# Patient Record
(demographics unavailable — no encounter records)

---

## 2017-07-20 NOTE — MAMMOGRAPHY REPORT
BILATERAL DIGITAL SCREENING MAMMOGRAM TOMOSYNTHESIS WITH CAD: 7/19/2017

CLINICAL HISTORY: Routine screening examination.  





TECHNIQUE:  Breast tomosynthesis in addition to standard 2D mammography was performed. Current study 
was also evaluated with a Computer Aided Detection (CAD) system.  



COMPARISON: Comparison is made to exams dated:  6/12/2015 mammogram, 6/17/2016 mammogram, 5/3/2013 ma
mmogram, 4/27/2012 mammogram, 4/22/2011 mammogram, and 4/2/2010 mammogram - [facility].   



BREAST COMPOSITION:  There are scattered areas of fibroglandular density in both breasts.  



FINDINGS: There are a few stable benign-appearing microcalcifications in the breasts.  No suspicious 
mass, architectural distortion or cluster of suspicious microcalcifications is seen.  



IMPRESSION:  ACR BI-RADS CATEGORY 1: NEGATIVE

There is no mammographic evidence of malignancy. A 1 year screening mammogram is recommended.  The pa
tient will receive written notification of the results.  





Approximately 10% of breast cancers are not detected with mammography. A negative mammographic report
 should not delay biopsy if a clinically suggestive mass is present.



Toshia Suazo M.D.          

ay/:7/19/2017 16:24:25  



Imaging Technologist: Marija FERGUSON(MARIA DEL CARMEN)(JOY), [facility]

letter sent: Normal 1/2  

BI-RADS Code: ACR BI-RADS Category 1: Negative

## 2017-10-13 NOTE — DIAGNOSTIC IMAGING REPORT
ABDOMEN AND PELVIS CT WITH IV CONTRAST



CT DOSE: 526.72 mGy.cm



HISTORY:      low abd pain, diarrhea



TECHNIQUE: Multiaxial CT images of the abdomen and pelvis were performed

following the use of intravenous contrast.  A dose lowering technique was

utilized adhering to the principles of ALARA.



COMPARISON STUDY: None.



FINDINGS: The lung bases are clear. No pneumoperitoneum. No pneumatosis. No

suspicious lytic or blastic osseous lesions. Tiny hiatus hernia. Tiny

fat-containing umbilical hernia. No retroperitoneal lymphadenopathy. The bladder

is unremarkable. The uterus is surgically absent. The liver, gallbladder,

spleen, adrenal glands, pancreas, and kidneys are unremarkable. Liquid stool

seen within the colon. Colonic diverticulosis. Normal appendix. There are

suggestion of minimal thickening and inflammatory change surrounding a short

segment of bowel within the right lower quadrant on images 72 through 75. This

favors a mild ileitis. Proximal to this decompressed loop of bowel there is a

slightly distended fluid-filled segment of small bowel measure up to 3 cm.



IMPRESSION: 

1. Suggestion of minimal thickening and inflammatory change surrounding a short

segment of bowel within the right lower quadrant. This likely represents a mild

ileitis. This favors infectious/inflammatory change.

2. Liquid stool throughout the colon consistent with the suspected

gastroenteritis/ileitis.

3. Single borderline distended loop of small bowel within the deep pelvis

proximal to the thickened loop of small bowel. This could represent a low-grade

partial small bowel obstruction due to the thickened loop of small bowel.

4. Normal appendix. 







Electronically signed by:  Gerald Tan M.D.

10/13/2017 11:29 AM



Dictated Date/Time:  10/13/2017 11:18 AM

## 2017-10-13 NOTE — EMERGENCY ROOM VISIT NOTE
History


First contact with patient:  09:01


Chief Complaint:  VOMITING


Stated Complaint:  VOMITING,STOMACH CRAMPING,DIARRHEA


Nursing Triage Summary:  


pt reports 2 weeks ago had flu like sx was tx by PCP for this with IV fluid and 


blood work. then started 2 days ago with NVD again took immodium with no help 


unable to keep food or liquid down





History of Present Illness


The patient is a 58 year old female who presents to the Emergency Room with 

complaints of abdominal cramps, vomiting and diarrhea.  The patient reports 

that her symptoms initially began last week.  She states that she developed 

some diarrhea approximately one week and a half ago and was seen by her primary 

care provider at that time and given Imodium and IV fluids.  She reports that 

she improved, however her symptoms returned yesterday.  She reports she has had 

some diarrhea, vomiting and lower abdominal cramping.  She rates her discomfort 

a 6/10.  She states that she has had frequent episodes of both diarrhea and 

vomiting.  She has been able to keep down some fluids, but no solid food.  She 

denies any recent antibiotic use, food exposures, unknown water sources or 

travel.  She does report she has had giardia in the past.  She denies any fevers

/chills or urinary symptoms.





Review of Systems


A complete 10 point review of systems was reviewed with the patient with 

pertinent positives and negatives as per history of present illness. All else 

were negative.





Past Medical/Surgical History


Medical Problems:


(1) Achilles rupture, left


(2) Benign hypertension


(3) Carpal tunnel syndrome


(4) Hyperlipidemia


(5) Hysterectomy


(6) Influenza A








Family History





Diabetes mellitus


FH: hypertension





Social History


Smoking Status:  Never Smoker


Alcohol Use:  occasionally


Marital Status:  


Housing Status:  lives with family


Occupation Status:  employed





Current/Historical Medications


Scheduled


Atenolol (Tenormin), 50 MG PO DAILY


Citalopram Hydrobromide (Celexa), 40 MG PO DAILY


Ondasetron Odt (Zofran Odt), 4 MG SL Q6H


Pravastatin Sod (Pravastatin Sodium), 40 MG PO DAILY





Physical Exam


Vital Signs











  Date Time  Temp Pulse Resp B/P (MAP) Pulse Ox O2 Delivery O2 Flow Rate FiO2


 


10/13/17 11:27  64 18 110/64 94 Room Air  


 


10/13/17 08:56 36.6 51 18 102/68 98 Room Air  











Physical Exam


VITALS: Vitals are noted on the nurse's note and reviewed by myself.  Vital 

signs stable.


GENERAL: This is a 58-year-old female, in no acute distress, nondiaphoretic, 

well-developed well-nourished.


SKIN: Capillary reflex less than 2 seconds.


HEENT: Normocephalic.  PERRLA. Mucous membranes moist.  Neck is supple without 

nuchal rigidity.


HEART: Regular rate and rhythm without murmurs gallops or rubs.


LUNGS: Clear to auscultation bilaterally without wheezes, rales or rhonchi.  


ABDOMEN: Positive bowel sounds x 4.  Soft, mild tenderness across the lower 

abdomen.  No guarding or rebound tenderness.


NEURO: Patient was alert and oriented to person place and time.





Medical Decision & Procedures


ER Provider


Diagnostic Interpretation:


ABDOMEN AND PELVIS CT WITH IV CONTRAST





CT DOSE: 526.72 mGy.cm





HISTORY:      low abd pain, diarrhea





TECHNIQUE: Multiaxial CT images of the abdomen and pelvis were performed


following the use of intravenous contrast.  A dose lowering technique was


utilized adhering to the principles of ALARA.





COMPARISON STUDY: None.





FINDINGS: The lung bases are clear. No pneumoperitoneum. No pneumatosis. No


suspicious lytic or blastic osseous lesions. Tiny hiatus hernia. Tiny


fat-containing umbilical hernia. No retroperitoneal lymphadenopathy. The bladder


is unremarkable. The uterus is surgically absent. The liver, gallbladder,


spleen, adrenal glands, pancreas, and kidneys are unremarkable. Liquid stool


seen within the colon. Colonic diverticulosis. Normal appendix. There are


suggestion of minimal thickening and inflammatory change surrounding a short


segment of bowel within the right lower quadrant on images 72 through 75. This


favors a mild ileitis. Proximal to this decompressed loop of bowel there is a


slightly distended fluid-filled segment of small bowel measure up to 3 cm.





IMPRESSION: 


1. Suggestion of minimal thickening and inflammatory change surrounding a short


segment of bowel within the right lower quadrant. This likely represents a mild


ileitis. This favors infectious/inflammatory change.


2. Liquid stool throughout the colon consistent with the suspected


gastroenteritis/ileitis.


3. Single borderline distended loop of small bowel within the deep pelvis


proximal to the thickened loop of small bowel. This could represent a low-grade


partial small bowel obstruction due to the thickened loop of small bowel.


4. Normal appendix.





Laboratory Results


10/13/17 09:30








Red Blood Count 4.77, Mean Corpuscular Volume 87.2, Mean Corpuscular Hemoglobin 

30.2, Mean Corpuscular Hemoglobin Concent 34.6, Mean Platelet Volume 9.8, 

Neutrophils (%) (Auto) 85.4, Lymphocytes (%) (Auto) 7.0, Monocytes (%) (Auto) 

6.7, Eosinophils (%) (Auto) 0.7, Basophils (%) (Auto) 0.1, Neutrophils # (Auto) 

8.47, Lymphocytes # (Auto) 0.70, Monocytes # (Auto) 0.67, Eosinophils # (Auto) 

0.07, Basophils # (Auto) 0.01





10/13/17 09:30

















Test


  10/13/17


09:30


 


White Blood Count


  9.93 K/uL


(4.8-10.8)


 


Red Blood Count


  4.77 M/uL


(4.2-5.4)


 


Hemoglobin


  14.4 g/dL


(12.0-16.0)


 


Hematocrit 41.6 % (37-47) 


 


Mean Corpuscular Volume


  87.2 fL


()


 


Mean Corpuscular Hemoglobin


  30.2 pg


(25-34)


 


Mean Corpuscular Hemoglobin


Concent 34.6 g/dl


(32-36)


 


Platelet Count


  295 K/uL


(130-400)


 


Mean Platelet Volume


  9.8 fL


(7.4-10.4)


 


Neutrophils (%) (Auto) 85.4 % 


 


Lymphocytes (%) (Auto) 7.0 % 


 


Monocytes (%) (Auto) 6.7 % 


 


Eosinophils (%) (Auto) 0.7 % 


 


Basophils (%) (Auto) 0.1 % 


 


Neutrophils # (Auto)


  8.47 K/uL


(1.4-6.5)


 


Lymphocytes # (Auto)


  0.70 K/uL


(1.2-3.4)


 


Monocytes # (Auto)


  0.67 K/uL


(0.11-0.59)


 


Eosinophils # (Auto)


  0.07 K/uL


(0-0.5)


 


Basophils # (Auto)


  0.01 K/uL


(0-0.2)


 


RDW Standard Deviation


  41.4 fL


(36.4-46.3)


 


RDW Coefficient of Variation


  13.0 %


(11.5-14.5)


 


Immature Granulocyte % (Auto) 0.1 % 


 


Immature Granulocyte # (Auto)


  0.01 K/uL


(0.00-0.02)


 


Toxic Granulation 1+ 


 


Anion Gap


  8.0 mmol/L


(3-11)


 


Est Creatinine Clear Calc


Drug Dose 62.4 ml/min 


 


 


Estimated GFR (


American) 64.1 


 


 


Estimated GFR (Non-


American 55.3 


 


 


BUN/Creatinine Ratio 16.7 (10-20) 


 


Calcium Level


  9.0 mg/dl


(8.5-10.1)


 


Total Bilirubin


  1.0 mg/dl


(0.2-1)


 


Aspartate Amino Transf


(AST/SGOT) 18 U/L (15-37) 


 


 


Alanine Aminotransferase


(ALT/SGPT) 41 U/L (12-78) 


 


 


Alkaline Phosphatase


  71 U/L


()


 


Total Protein


  7.5 gm/dl


(6.4-8.2)


 


Albumin


  3.9 gm/dl


(3.4-5.0)


 


Globulin


  3.6 gm/dl


(2.5-4.0)


 


Albumin/Globulin Ratio 1.1 (0.9-2) 


 


Lipase


  167 U/L


()











Medications Administered











 Medications


  (Trade)  Dose


 Ordered  Sig/Cristina


 Route  Start Time


 Stop Time Status Last Admin


Dose Admin


 


 Sodium Chloride  1,000 ml @ 


 999 mls/hr  Q1H1M STAT


 IV  10/13/17 09:17


 10/13/17 10:17 DC 10/13/17 09:41


999 MLS/HR


 


 Ondansetron HCl


  (Zofran Inj)  4 mg  NOW  STAT


 IV  10/13/17 09:20


 10/13/17 09:21 DC 10/13/17 09:39


4 MG











ED Course


The patient was evaluated as above.  Labs were drawn and IV access was 

obtained.  





Patient was medicated with IV fluids and Zofran.





CT of the abdomen and pelvis was performed and read by radiology as above. 





Patient was reevaluated and states she feels much better.  Findings were 

discussed with the patient at this time.





Discharge instructions were reviewed with the patient.  The patient verbalized 

understanding of my assessment and treatment plan and was discharged home in 

good condition.





Medical Decision


Differential diagnosis includes gastroenteritis, colitis, diverticulitis, 

Giardia, small bowel obstruction, C. difficile colitis, among others.





The patient is a 58-year-old female who presents today complaining of abdominal 

cramping, diarrhea and vomiting.  Labs revealed no leukocytosis, anemia or 

concerning electrolyte abnormalities.  CT scan was performed due to the patient'

s lower abdominal tenderness and revealed evidence of gastroenteritis/ileitis.  

There was one borderline dilated loop of bowel with some concern for possible 

partial small bowel obstruction.  However, the patient is still moving her 

bowels and her nausea has resolved at this time.  I do not feel this represents 

bowel obstruction but did recommended the patient keep a clear liquid diet for 

the next 1-2 days.  The patient was not able to provide a stool sample 

throughout her stay and was given an order to have this testing done as an 

outpatient.  She was given a prescription for Zofran.  She was advised to follow

-up with her primary care provider.  She verbalized understanding of my 

assessment and treatment plan was discharged home in good condition.





Based on the patient's presentation and work up, I feel the patient is stable 

for outpatient treatment.  The patient was educated to return to the emergency 

department for any worsening of their current condition or new/concerning 

symptoms.  She will follow up with her PCP.





The patient was independently evaluated by Dr. Zavala, ED attending physician

, who agreed with my assessment and treatment plan.





Medication Reconcilliation


Current Medication List:  was personally reviewed by me





Blood Pressure Screening


Patient's blood pressure:  Normal blood pressure





Impression





 Primary Impression:  


 Acute gastroenteritis





Departure Information


Dispostion


Home / Self-Care





Condition


GOOD





Prescriptions





Ondasetron Odt (ZOFRAN ODT) 4 Mg Tab


4 MG SL Q6H for Nausea, #15 TAB


   Prov: Sugar Jimenez .VILLA         10/13/17





Referrals


Alyssa Fulton M.D. (PCP)





Patient Instructions


[facility]





Additional Instructions





You have been prescribed Zofran to be used for any nausea or vomiting. Take as 

prescribed.





For pain control, you can use the following over-the-counter medicines (if >13 yo):





- Regular strength (325mg/tab) Tylenol (acetaminophen) 2 tabs every 4-6 hours 

as needed. Do not exceed 12 tablets in a 24 hour period. Avoid taking more than 

4 grams (4000 mg) of Tylenol per day. This includes any other sources of 

acetaminophen you may take on a regular basis.





- Regular strength (200 mg/tab) Advil (ibuprofen) 1-2 tabs every 4-6 hours as 

needed. Do not exceed a dose of 3200 mg per day.





Rest and drink plenty of fluids.  You should keep a clear liquid diet for the 

next 48 hours.





Follow-up with your primary care provider early next week for a recheck.





Return to the emergency department with worsening vomiting, worsening pain, 

fevers or any other new/concerning symptoms.

## 2017-10-26 NOTE — DISCHARGE INSTRUCTIONS
Endoscopy Patient Instructions


Date / Procedure(s) Performed


Oct 26, 2017.


Colonoscopy





Allergy Information


Coded Allergies:  


     No Known Allergies (Verified , 10/26/17)





Discharge Date / Findings


Oct 26, 2017.


Random ileum biopsies


Random colon biopsies


Stool aspirate collected


Internal Hemorrhoids





Medication Instructions


OK to resume all medications today as prescribed





Reported Home Medications








 Medications  Dose


 Route/Sig


 Max Daily Dose Days Date Category


 


 Calcium + D


  (Calcium


 Carbonate-Vitamin


 D) 1 Tab Tab  1 Tab


 PO BID


    10/23/17 Reported


 


 Citalopram


 Hydrobromide


  (Citalopram) 40


 Mg Tab  1 Tab


 PO QAM


   90 10/23/17 Reported


 


 Lipitor


  (Atorvastatin


 Calcium) 40 Mg Tab  40 Mg


 PO QPM


    10/23/17 Reported


 


 Tenormin


  (Atenolol) 25 Mg


 Tab  2 Tab


 PO QAM


    10/23/17 Reported











Provider Instructions





Activity Restrictions





-  No exercising or heavy lifting for 24 hours. 


-  Do not drink alcohol the day of the procedure.


-  Do not drive a car or operate machinery until the day after the procedure.


-  Do not make any important decisions or sign important papers in 24 hours 

after the procedure.





Following Day:





-  Return to full activity which may include returning to work/school.





Diet





Start your diet with liquids and light foods (jello, soup, juice, toast).  Then 

eat your usual diet if not nauseated.





Treatment For Common After Affects





For mild abdominal pain, bloating, or excessive gas:





-  Rest


-  Eat lightly


-  Lie on right side





Follow-Up Information


Follow-up with DR. DORSEY as scheduled





Anesthesia Information





What You Should Know





You have had a procedure that required some medicine to reduce anxiety and 

discomfort. This treatment is called moderate sedation.  


After receiving the treatment, you may be sleepy, but you will be able to 

breathe on your own.  The effects of the treatment may last for several hours.








Follow these instructions along with Activity/Diet recommendations noted above:





*  Do NOT do anything where dizziness or clumsiness would be dangerous.





*  Rest quietly at home today, then you can be up and about tomorrow.





*  Have a responsible person stay with you the rest of today.





*  You may have had an I.V. today.  If so, you may take the dressing off later 

today.





Recommendations


 


Call your doctor if:





*  Trouble breathing 





*  Continuous vomiting for more than 24 hours








*  Temperature above 101 degrees





*  Severe abdominal pain or bloating





*  Pain not relieved by pain medicine ordered





*  There is increased drainage or redness from any incision





*  A large amount of rectal bleeding greater than 2-3 tablespoons. 


   (If you had a polyp/s removed or have hemorrhoids, a small amount of blood -


    from the rectum is to be expected.)





*  You have any unanswered questions or concerns.








IN THE EVENT OF A SERIOUS EMERGENCY, GO TO THE NEAREST EMERGENCY ROOM








       Your discharge instructions were prepared by provider Jj Conklin.





 Patient Instructions Signature Page














Parisa Mathew 











Patient (or Guardian) Signature/Date:____________________________________ I 

have read and understand the instructions given to me by my caregivers.








Caregiver/RN/Doctor Signature/Date:____________________________________











The above-named patient and/or guardian has received patient instructions on 

this date.





























+  Original Patient Signature Page (only) stays with chart.  Please make copy 

for patient.

## 2017-10-26 NOTE — ANESTHESIOLOGY PROGRESS NOTE
Anesthesia Post Op Note


Date & Time


Oct 26, 2017 at 16:58





Vital Signs


Pain Intensity:  3





Vital Signs Past 12 Hours








  Date Time  Temp Pulse Resp B/P (MAP) Pulse Ox O2 Delivery O2 Flow Rate FiO2


 


10/26/17 16:57  57 16 133/96 (108) 97 Room Air  


 


10/26/17 16:42  65 16 129/86 (100) 94 Room Air  


 


10/26/17 16:27  73 16 106/76 (86) 93 Nasal Cannula 2 


 


10/26/17 15:15 36.6 60 18 124/80 (95) 95 Room Air  











Notes


Mental Status:  alert / awake / arousable, participated in evaluation


Pt Amnestic to Procedure:  Yes


Nausea / Vomiting:  adequately controlled


Pain:  adequately controlled


Airway Patency, RR, SpO2:  stable & adequate


BP & HR:  stable & adequate


Hydration State:  stable & adequate


Anesthetic Complications:  no major complications apparent

## 2017-10-26 NOTE — ENDO HISTORY AND PHYSICAL
History & Physical


Date of Service:


Oct 26, 2017.


Chief Complaint:


ABNORMAL CT OF THE ILEUM, DIARRHEA AND ABD PAIN


Referring Physician:


DR. DORSEY


History of Present Illness


59 yo CF who presents for colonoscopy secondary to abnormal CT scan of the 

ileum.





Past Surgical History


Hx Cardiac Surgery:  No


Hx Internal Defibrillator:  No


Hx Pacemaker:  No


Hx Abdominal Surgery:  Yes (TAHBSO)


Hx of Implantable Prosthesis:  No


Hx Post-Op Nausea and Vomiting:  No


Hx Cancer Surgery:  No


Hx Thoracic Surgery:  No


Hx Orthopedic:  Yes (LEFT ACHILLES TENDON REPAIR, LEFT/RT CTR)


Hx Urinary Tract Surgery:  No





Family History


IBD





Social History


Smoking Status:  Former Smoker


Hx Substance Use:  No


Hx Alcohol Use:  Yes (OCCASIONALLY)





Allergies


Coded Allergies:  


     No Known Allergies (Verified , 10/26/17)





Current Medications





Reported Home Medications








 Medications  Dose


 Route/Sig


 Max Daily Dose Days Date Category


 


 Calcium + D


  (Calcium


 Carbonate-Vitamin


 D) 1 Tab Tab  1 Tab


 PO BID


    10/23/17 Reported


 


 Citalopram


 Hydrobromide


  (Citalopram) 40


 Mg Tab  1 Tab


 PO QAM


   90 10/23/17 Reported


 


 Lipitor


  (Atorvastatin


 Calcium) 40 Mg Tab  40 Mg


 PO QPM


    10/23/17 Reported


 


 Tenormin


  (Atenolol) 25 Mg


 Tab  2 Tab


 PO QAM


    10/23/17 Reported











Vital Signs


Weight (Kilograms):  86.36


Height (Feet):  5


Height (Inches):  6











  Date Time  Temp Pulse Resp B/P (MAP) Pulse Ox O2 Delivery O2 Flow Rate FiO2


 


10/26/17 15:15 36.6 60 18 124/80 (95) 95 Room Air  











Physical Exam


General Appearance:  WD/WN, no apparent distress


Respiratory/Chest:  


   Auscultation:  breath sounds normal


Cardiovascular:  


   Heart Auscultation:  RRR


Abdomen:  


   Bowel Sounds:  normal


   Inspection & Palpation:  soft, non-distended, no tenderness, guarding & 

rebound





Assessment and Plan


Assessment:


59 yo CF who presents for colonoscopy secondary to abnormal CT scan of the 

ileum.








Plan:


Proceed with colonoscopy.

## 2017-10-26 NOTE — GI REPORT
Procedure Date: 10/26/2017 3:39 PM

Procedure:            Colonoscopy

Indications:          Abnormal CT of the GI tract

Medicines:            Monitored Anesthesia Care

Complications:        No immediate complications.

Estimated Blood Loss: Estimated blood loss: none.

Procedure:            Pre-Anesthesia Assessment:

                      - Prior to the procedure, a History and Physical was 

                      performed, and patient medications and allergies were 

                      reviewed. The patient's tolerance of previous 

                      anesthesia was also reviewed. The risks and benefits of 

                      the procedure and the sedation options and risks were 

                      discussed with the patient. All questions were 

                      answered, and informed consent was obtained. Prior 

                      Anticoagulants: The patient has taken no previous 

                      anticoagulant or antiplatelet agents. ASA Grade 

                      Assessment: II - A patient with mild systemic disease. 

                      After reviewing the risks and benefits, the patient was 

                      deemed in satisfactory condition to undergo the 

                      procedure.

                      After I obtained informed consent, the scope was passed 

                      under direct vision. Throughout the procedure, the 

                      patient's blood pressure, pulse, and oxygen saturations 

                      were monitored continuously. The scope was introduced 

                      through the anus and advanced to the terminal ileum. 

                      The colonoscopy was performed without difficulty. The 

                      patient tolerated the procedure well. The quality of 

                      the bowel preparation was good. The terminal ileum, 

                      ileocecal valve, appendiceal orifice, and rectum were 

                      photographed.

Findings:

     The terminal ileum appeared normal. Biopsies were taken with a cold 

     forceps for histology.

     The colon (entire examined portion) appeared normal. Several random 

     biopsies were obtained with cold forceps for histology in the entire 

     colon. Fluid aspiration for cytology was performed.

     Non-bleeding internal hemorrhoids were found during retroflexion. The 

     hemorrhoids were small.

Impression:           - The examined portion of the ileum was normal. 

                      Biopsied.

                      - The entire examined colon is normal. Fluid aspiration 

                      performed.

                      - Non-bleeding internal hemorrhoids.

                      - Several random biopsies were obtained in the entire 

                      colon.

Recommendation:       - Resume previous diet.

                      - Continue present medications.

                      - Await pathology results.

                      - Return to GI office as previously scheduled.

Jj Conklin DO

10/26/2017 4:33:17 PM

This report has been signed electronically.

Note Initiated On: 10/26/2017 3:39 PM

     I attest to the content of the Intraoperative Record and orders 

     documented therein, exceptions below

## 2017-11-16 NOTE — DIAGNOSTIC IMAGING REPORT
CT ABD/PELVIS IV AND ORAL CONT (enterography study)



CLINICAL HISTORY: R10.9 Abdominal painR19.7 [id number] abnormal prior CT scan



 COMPARISON STUDY:  10/13/2017



TECHNIQUE: Following the IV administration of 100 mL of Optiray-320, CT scan of

the abdomen and pelvis was performed from the lung bases to the proximal femurs.

Images are reviewed in the axial, sagittal, and coronal planes. IV contrast was

administered without complication.  A dose lowering technique was utilized

adhering to the principles of ALARA.





CT DOSE: 571.01 mGy.cm



FINDINGS:



Lower chest: There are minor dependent atelectatic changes.



Liver: 6 there is mild hepatic steatosis. No focal masses are visualized.



Gallbladder: Unremarkable.



Spleen: Normal in size and attenuation.



Pancreas: Unremarkable.



Adrenal glands: Unremarkable.



Kidneys: There is symmetric renal cortical enhancement. The kidneys are normal

in size without hydronephrosis.



Bowel: There are no transition zones to indicate bowel obstruction. The appendix

appears normal. There is a single short segment bowel loop within the low

anterior pelvis demonstrating mild hyperenhancement. This appears to represent a

collapsed small bowel loop with focal peristalsis. This is unlikely to represent

true pathologic bowel wall thickening or pathologic bowel wall enhancement. The

previously queried right lower quadrant inflammatory changes, appear to have

resolved.



Peritoneum: There is no intraperitoneal free air or abdominal ascites. There is

a tiny fat-containing umbilical hernia.



Vasculature: The abdominal aorta is normal in course and caliber.



Adenopathy: None.



Pelvic viscera: The uterus appears surgically absent.



Skeletal structures: No destructive osseous lesions are seen.



IMPRESSION:  

1. No acute abdominal or pelvic findings

2. No evidence of bowel obstruction. No evidence of free air

3. Normal appendix

4. No inflammatory small bowel findings are identified







Electronically signed by:  Umesh Cobian M.D.

11/16/2017 2:56 PM



Dictated Date/Time:  11/16/2017 2:48 PM

## 2018-07-24 NOTE — MAMMOGRAPHY REPORT
BILATERAL DIGITAL SCREENING MAMMOGRAM TOMOSYNTHESIS WITH CAD: 7/24/2018

CLINICAL HISTORY: Routine screening. Patient has no complaints.  





TECHNIQUE: The study was acquired using full field digital technology and interpreted from soft copy.
 Breast tomosynthesis in addition to standard 2D mammography was performed. Current study was also ev
aluated with a Computer Aided Detection (CAD) system.  



COMPARISON: Comparison is made to exams dated:  5/25/2018 mammogram, 7/19/2017 mammogram, 6/17/2016 m
ammogram, 6/12/2015 mammogram, and 6/6/2014 mammogram - [facility].   

BREAST COMPOSITION: There are scattered areas of fibroglandular density in both breasts.  



FINDINGS: There is stable nodular asymmetry in the lateral right breast.  A few scattered benign-appe
aring calcifications. No suspicious mass, architectural distortion or cluster of microcalcifications 
is seen.  



IMPRESSION: ACR BI-RADS CATEGORY 1: NEGATIVE

There is no mammographic evidence of malignancy. A 1 year screening mammogram is recommended.(07/25/2
019)  The patient will receive written notification of the results.  





Some breast cancers are not detected with mammography. A negative mammographic report should not stuart
y biopsy if a clinically suggestive mass is present.



Toshia Suazo M.D.          

ay/:7/24/2018 08:43:57  



Imaging Technologist: RT Gage(MARIA DEL CARMEN)(M), [facility]

letter sent: Normal 1/2  

BI-RADS Code: ACR BI-RADS Category 1: Negative